# Patient Record
Sex: MALE | Race: WHITE | NOT HISPANIC OR LATINO | Employment: FULL TIME | URBAN - NONMETROPOLITAN AREA
[De-identification: names, ages, dates, MRNs, and addresses within clinical notes are randomized per-mention and may not be internally consistent; named-entity substitution may affect disease eponyms.]

---

## 2022-06-28 ENCOUNTER — OFFICE VISIT (OUTPATIENT)
Dept: FAMILY MEDICINE | Facility: OTHER | Age: 42
End: 2022-06-28
Attending: STUDENT IN AN ORGANIZED HEALTH CARE EDUCATION/TRAINING PROGRAM
Payer: COMMERCIAL

## 2022-06-28 VITALS
HEART RATE: 82 BPM | TEMPERATURE: 98.8 F | WEIGHT: 216.8 LBS | DIASTOLIC BLOOD PRESSURE: 98 MMHG | RESPIRATION RATE: 16 BRPM | SYSTOLIC BLOOD PRESSURE: 150 MMHG | OXYGEN SATURATION: 98 %

## 2022-06-28 DIAGNOSIS — S81.812A LACERATION OF LEFT LOWER EXTREMITY, INITIAL ENCOUNTER: Primary | ICD-10-CM

## 2022-06-28 PROCEDURE — 250N000009 HC RX 250: Performed by: STUDENT IN AN ORGANIZED HEALTH CARE EDUCATION/TRAINING PROGRAM

## 2022-06-28 PROCEDURE — 90471 IMMUNIZATION ADMIN: CPT | Performed by: STUDENT IN AN ORGANIZED HEALTH CARE EDUCATION/TRAINING PROGRAM

## 2022-06-28 PROCEDURE — 12002 RPR S/N/AX/GEN/TRNK2.6-7.5CM: CPT | Performed by: STUDENT IN AN ORGANIZED HEALTH CARE EDUCATION/TRAINING PROGRAM

## 2022-06-28 PROCEDURE — 99203 OFFICE O/P NEW LOW 30 MIN: CPT | Mod: 25 | Performed by: STUDENT IN AN ORGANIZED HEALTH CARE EDUCATION/TRAINING PROGRAM

## 2022-06-28 PROCEDURE — 90715 TDAP VACCINE 7 YRS/> IM: CPT | Performed by: STUDENT IN AN ORGANIZED HEALTH CARE EDUCATION/TRAINING PROGRAM

## 2022-06-28 RX ORDER — LIDOCAINE HYDROCHLORIDE 10 MG/ML
10 INJECTION, SOLUTION INFILTRATION; PERINEURAL ONCE
Status: COMPLETED | OUTPATIENT
Start: 2022-06-28 | End: 2022-06-28

## 2022-06-28 RX ADMIN — LIDOCAINE HYDROCHLORIDE 10 ML: 10 INJECTION, SOLUTION EPIDURAL; INFILTRATION; INTRACAUDAL; PERINEURAL at 19:33

## 2022-06-28 ASSESSMENT — PAIN SCALES - GENERAL: PAINLEVEL: NO PAIN (0)

## 2022-06-28 NOTE — PROGRESS NOTES
Mr. Gonzalez is a 41 year old male who presents to the clinic for leg injury    HPI  [place] dock and he slipped and fell through cutting his anterior left shin on some angle iron.  Last tetanus shot was 2015.        Review of Systems     Reviewed and updated as needed this visit by Provider                     EXAM:   Vitals:    06/28/22 1722 06/28/22 1726   BP: (!) 150/100 (!) 150/98   BP Location: Left arm Left arm   Patient Position: Sitting Sitting   Cuff Size: Adult Regular Adult Regular   Pulse: 82    Resp: 16    Temp: 98.8  F (37.1  C)    TempSrc: Temporal    SpO2: 98%    Weight: 98.3 kg (216 lb 12.8 oz)          BP Readings from Last 3 Encounters:   06/28/22 (!) 150/98      Wt Readings from Last 3 Encounters:   06/28/22 98.3 kg (216 lb 12.8 oz)      There is no height or weight on file to calculate BMI.     Physical Exam   General: Pleasant 41-year-old man sitting clinic no acute distress  Skin: 7 cm laceration to the left anterior shin clean and reapproximates well at the base not as well at the superior margin.    INVESTIGATIONS:  - Labs reviewed in Epic     ASSESSMENT AND PLAN:    ICD-10-CM    1. Laceration of left lower extremity, initial encounter  S81.812A lidocaine 1 % injection 10 mL     Procedure rep, skin scalp/extrem+5cm/<       Assessment/Plan:     Laceration of the left lower extremity:  Procedure- patient was anesthetized with 1% lidocaine without epinephrine due to shortage.  Wound was cleansed with normal saline and Hibiclens.  Margins were reapproximated and greater than 15 running nylon sutures were placed on the shin.  Reapproximated well.  No foreign body appreciated intact strength sensation and no evidence of vascular damage.    Patient was given a tetanus booster in clinic and advised not to soak his leg for the duration of his vacation advised conservative management with bandage plus or minus Neosporin/Vaseline and have sutures removed in 10 to 14 days with his  PCP        Electronically signed by:  Darvin Agrawal MD on 6/28/2022  Internal Medicine  [facility]

## (undated) RX ORDER — LIDOCAINE HYDROCHLORIDE 10 MG/ML
INJECTION, SOLUTION EPIDURAL; INFILTRATION; INTRACAUDAL; PERINEURAL
Status: DISPENSED
Start: 2022-06-28